# Patient Record
Sex: MALE | Race: BLACK OR AFRICAN AMERICAN | NOT HISPANIC OR LATINO | URBAN - METROPOLITAN AREA
[De-identification: names, ages, dates, MRNs, and addresses within clinical notes are randomized per-mention and may not be internally consistent; named-entity substitution may affect disease eponyms.]

---

## 2019-06-23 ENCOUNTER — EMERGENCY (EMERGENCY)
Facility: HOSPITAL | Age: 54
LOS: 0 days | Discharge: HOME | End: 2019-06-23
Attending: EMERGENCY MEDICINE | Admitting: EMERGENCY MEDICINE
Payer: COMMERCIAL

## 2019-06-23 VITALS
RESPIRATION RATE: 18 BRPM | DIASTOLIC BLOOD PRESSURE: 78 MMHG | HEART RATE: 60 BPM | TEMPERATURE: 98 F | SYSTOLIC BLOOD PRESSURE: 130 MMHG | OXYGEN SATURATION: 98 %

## 2019-06-23 DIAGNOSIS — S02.609A FRACTURE OF MANDIBLE, UNSPECIFIED, INITIAL ENCOUNTER FOR CLOSED FRACTURE: ICD-10-CM
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
## 2019-06-23 PROCEDURE — 99284 EMERGENCY DEPT VISIT MOD MDM: CPT

## 2019-06-23 RX ORDER — AMOXICILLIN 250 MG/5ML
1 SUSPENSION, RECONSTITUTED, ORAL (ML) ORAL
Qty: 30 | Refills: 0
Start: 2019-06-23 | End: 2019-07-02

## 2019-06-23 NOTE — ED PROVIDER NOTE - NSFOLLOWUPCLINICS_GEN_ALL_ED_FT
[facility]  Dental  [address] [place] [id number]  Phone: [phone]  Fax:   Follow Up Time: 1-3 Days

## 2019-06-23 NOTE — ED PROVIDER NOTE - OBJECTIVE STATEMENT
55 y/o M, no significant PMHx, presents to the ED as a transfer from [facility] ED with complaints of a mandibular fx sustained at approximately 0400 this AM. Patient tripped and fell in his home, hitting his mouth on metal frame of bed. Whilst at [facility] ED, CT-Scan revealed an ''acute nondisplaced fracture involving right anterior mandible extending through the alveolar ridge.' He denies any cephalgia, nausea, vomiting, and additional injuries.

## 2019-06-23 NOTE — CONSULT NOTE ADULT - SUBJECTIVE AND OBJECTIVE BOX
Patient is a 54y old  Male who presents with a chief complaint of fracture involving right anterior mandible extending through alveolar ridge and loose teeth    HPI: Patient fell and tripped over his bed frame hitting his mandible this morning.       PAST MEDICAL & SURGICAL HISTORY:  PTSD    ( n  ) heart valve replacement  ( n  ) joint replacement  ( n  ) pregnancy    MEDICATIONS  (STANDING): Zoloft, Depakote, Trazodone    MEDICATIONS  (PRN):    Allergies  No known allergies    *SOCIAL HISTORY: (   ) Tobacco; (   ) ETOH    Vital Signs Last 24 Hrs  T(C): 36.6 (23 Jun 2019 11:50), Max: 36.6 (23 Jun 2019 11:50)  T(F): 97.8 (23 Jun 2019 11:50), Max: 97.8 (23 Jun 2019 11:50)  HR: 60 (23 Jun 2019 11:50) (60 - 60)  BP: 130/78 (23 Jun 2019 11:50) (130/78 - 130/78)  BP(mean): --  RR: 18 (23 Jun 2019 11:50) (18 - 18)  SpO2: 98% (23 Jun 2019 11:50) (98% - 98%)    EOE:  TMJ ( n  ) clicks                     ( n  ) pops                     ( n  ) crepitus             Mandible <<FROM>>             Facial bones and MOM <<grossly intact>>             ( n  ) trismus             ( n  ) lymphadenopathy             ( y  ) swelling             ( n  ) asymmetry             ( n  ) palpation             ( n  ) dyspnea             ( n  ) dysphagia             ( n  ) loss of consciousness    IOE:  permanent dentition:           <<multiple missing teeth, #25, 26, 27 are mobile and are moving as one segment. Patient fully edentulous on maxilla and has #22, 23, 24, 25, 26, 27 present.           hard/soft palate:  ( n  ) palatal torus, <<No pathology noted>>            tongue/FOM <<No pathology noted>>            labial/buccal mucosa <<No pathology noted>>           ( y  ) percussion           ( y  ) palpation           ( n  ) swelling            ( n  ) abscess           ( n  ) sinus tract    RADIOLOGY & ADDITIONAL STUDIES: CT scan from [facility]    *ASSESSMENT: As per radiology impression, acute nondiplaced fracture involving right anterior mandible extending through the alveolar ridge.     *PLAN: Attempt splint, patient to return to dental clinic tomorrow morning for evaluation     PROCEDURE:   Verbal and written consent given.  Anesthesia: 2 carpules of 2% lidocaine with 1:100,000 epinephrine administered as inferior alveolar nerve block and local infiltrations.   Treatment: Moved segment with #25, 26, 27 and alveolar ridge back into place.  Attempted to splint 3 times, but unsuccessful. Instructed patient to return to dental clinic tomorrow for follow up and explained that there is a high chance his mandibular teeth may need to be extracted tomorrow. Patient understands.     RECOMMENDATIONS:  1) Amoxicillin 500mg 1q8h x 7days and pain medication   2) Dental F/U with outpatient dentist for comprehensive dental care.   3) If any difficulty swallowing/breathing, fever occur, return to ER.     Maame Mcknight DDS

## 2019-06-23 NOTE — ED ADULT TRIAGE NOTE - CHIEF COMPLAINT QUOTE
fall today, hit mouth on metal of bed, transferred from [facility] for mandible fx

## 2019-06-23 NOTE — ED ADULT NURSE NOTE - CHIEF COMPLAINT QUOTE
fall today, hit mouth on metal of bed, transferred from [facility] for mandible fx

## 2019-06-23 NOTE — ED PROVIDER NOTE - ATTENDING CONTRIBUTION TO CARE
55 yo male with mandible fx after a trip and fall at 4 AM in his house, no LOC.  Exam as above, will obtain dental consult.

## 2019-06-23 NOTE — ED ADULT NURSE NOTE - OBJECTIVE STATEMENT
Patient presents with s/p fall. Patient states that he was walking in the middle of the night and slipped. Patient hit face on metal bed frame. Patient went to [facility] and had xrays done showing right mandible fx. States he is unsure if there was LOC. Denies vomiting or use of blood thinners.

## 2019-06-23 NOTE — ED PROVIDER NOTE - PROGRESS NOTE DETAILS
Spoke with dental resident - will provide consultation in the ED Dental resident provided consultation. Attempted to splint however unsuccessful so patient will come to dental clinic tomorrow for further treatment. Advised d/c to home on Amoxicillin and pain medication.

## 2019-06-23 NOTE — CONSULT NOTE ADULT - CONSULT REASON
Transfer from [facility] with acute nondisplaced fracture involving right anterior mandible extending through the alveolar ridge

## 2019-06-23 NOTE — ED PROVIDER NOTE - ENMT, MLM
+ displacement of teeth 6-8 with associated laxity ; Airway patent, Nasal mucosa clear. Mouth with normal mucosa. Throat has no vesicles, no oropharyngeal exudates and uvula is midline.

## 2019-06-23 NOTE — ED PROVIDER NOTE - CLINICAL SUMMARY MEDICAL DECISION MAKING FREE TEXT BOX
I have discussed the discharge plan with the patient. The patient agrees with the plan, as discussed.  The patient understands Emergency Department diagnosis is a preliminary diagnosis often based on limited information and that the patient must adhere to the follow-up plan as discussed.  The patient understands that if the symptoms worsen or if prescribed medications do not have the desired/planned effect that the patient may return to the Emergency Department at any time for further evaluation and treatment. 55 yo male with mandible fx after a trip and fall at 4 AM in his house/.  Seen by dental resident, will follow up in dental clinic tomorrow.  I have discussed the discharge plan with the patient. The patient agrees with the plan, as discussed.  The patient understands Emergency Department diagnosis is a preliminary diagnosis often based on limited information and that the patient must adhere to the follow-up plan as discussed.  The patient understands that if the symptoms worsen or if prescribed medications do not have the desired/planned effect that the patient may return to the Emergency Department at any time for further evaluation and treatment.

## 2025-04-18 NOTE — ED ADULT TRIAGE NOTE - TEMPERATURE IN CELSIUS (DEGREES C)
Detail Level: Detailed Quality 226: Preventive Care And Screening: Tobacco Use: Screening And Cessation Intervention: Patient screened for tobacco use and is an ex/non-smoker Quality 130: Documentation Of Current Medications In The Medical Record: Current Medications Documented Quality 431: Preventive Care And Screening: Unhealthy Alcohol Use - Screening: Patient not identified as an unhealthy alcohol user when screened for unhealthy alcohol use using a systematic screening method 36.6